# Patient Record
Sex: FEMALE | Race: WHITE | NOT HISPANIC OR LATINO | URBAN - METROPOLITAN AREA
[De-identification: names, ages, dates, MRNs, and addresses within clinical notes are randomized per-mention and may not be internally consistent; named-entity substitution may affect disease eponyms.]

---

## 2022-01-04 ENCOUNTER — NEW PATIENT (OUTPATIENT)
Dept: URBAN - METROPOLITAN AREA CLINIC 14 | Facility: CLINIC | Age: 62
End: 2022-01-04

## 2022-01-04 DIAGNOSIS — H25.13: ICD-10-CM

## 2022-01-04 PROCEDURE — 92004 COMPRE OPH EXAM NEW PT 1/>: CPT

## 2022-01-04 PROCEDURE — 92015 DETERMINE REFRACTIVE STATE: CPT

## 2022-01-04 ASSESSMENT — TONOMETRY
OD_IOP_MMHG: 12
OS_IOP_MMHG: 13

## 2022-01-04 ASSESSMENT — VISUAL ACUITY
OD_BCVA: 20/60
OS_BCVA: 20/40

## 2022-01-04 NOTE — PATIENT DISCUSSION
VISUALLY SIGNIFICANT: I have discussed the option of glasses versus cataract surgery versus following. It was explained that when the patients vision no longer meets their visual needs and a glasses prescription does not improve visual symptoms, the option of cataract surgery is a reasonable next step. It was explained that there is no guarantee that removing the cataract will improve their visual symptoms, however; it is believed that the cataract is contributing to the patient's visual impairment and surgery may improve both the visual and functional status of the patient. The risks, benefits and alternatives of surgery were discussed with the patient.  REFER to [facility] for [place] [facility]U.

## 2022-01-11 ENCOUNTER — ESTABLISHED PATIENT (OUTPATIENT)
Dept: URBAN - METROPOLITAN AREA CLINIC 9 | Facility: CLINIC | Age: 62
End: 2022-01-11

## 2022-01-11 VITALS — DIASTOLIC BLOOD PRESSURE: 94 MMHG | HEIGHT: 55 IN | SYSTOLIC BLOOD PRESSURE: 156 MMHG | HEART RATE: 91 BPM

## 2022-01-11 DIAGNOSIS — H25.043: ICD-10-CM

## 2022-01-11 DIAGNOSIS — H25.13: ICD-10-CM

## 2022-01-11 PROCEDURE — 92136 OPHTHALMIC BIOMETRY: CPT

## 2022-01-11 PROCEDURE — 99199ADVT ADVANCED VISION TESTING

## 2022-01-11 PROCEDURE — 99213 OFFICE O/P EST LOW 20 MIN: CPT

## 2022-01-11 ASSESSMENT — VISUAL ACUITY
OD_BCVA: 20/60
OS_BCVA: 20/40
OS_CC: 20/50
OD_CC: 20/60

## 2022-01-11 NOTE — PATIENT DISCUSSION
VISUALLY SIGNIFICANT SCHEDULE CATARACT SURGERY OD THEN OS IF SYMPTOMS PERSIST: I have discussed the option of glasses versus cataract surgery versus following. It was explained that when the patients vision no longer meets their visual needs and a glasses prescription does not improve visual symptoms, the option of cataract surgery is a reasonable next step. It was explained that there is no guarantee that removing the cataract will improve their visual symptoms, however; it is believed that the cataract is contributing to the patient's visual impairment and surgery may improve both the visual and functional status of the patient. The risks, benefits and alternatives of surgery were discussed with the patient. After this discussion, the patient desires to proceed with cataract surgery with implantation of an intraocular lens to improve vision.

## 2022-01-11 NOTE — PATIENT DISCUSSION
The patient desires to be less dependent on glasses at near, intermediate and distance. 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 Discussed compromises of trifocal and multi-focal iol's. She states she does not drive much at night. Pt to consider options.

## 2022-01-11 NOTE — PATIENT DISCUSSION
Advanced technology (presbyopia-correcting) implants can allow patients to see more things without glasses. The physician explained that advanced technology implants do not however guarantee good vision at all distances. The patient was advised that even with an advanced technology IOL, they may need glasses for some things. The advanced technology implant provides distance, intermediate, and some near vision. The near vision will require reading glasses for some things: particularly for fine print, prolonged reading or reading faded print like a newspaper. Starbursts or halos around lights are common side effects at night. Like other advanced technology implants, it can cause a loss of contrast which can affect activities that require good vision in dim lighting, such as driving at night or in poor visibility conditions, particularly with oncoming traffic. Advanced technology implants are optional and not covered by insurance, thus they are an additional expense.

## 2022-01-26 ENCOUNTER — POST-OP (OUTPATIENT)
Dept: URBAN - METROPOLITAN AREA CLINIC 14 | Facility: CLINIC | Age: 62
End: 2022-01-26

## 2022-01-26 DIAGNOSIS — Z96.1: ICD-10-CM

## 2022-01-26 DIAGNOSIS — Z98.890: ICD-10-CM

## 2022-01-26 PROCEDURE — 99024 POSTOP FOLLOW-UP VISIT: CPT

## 2022-01-26 ASSESSMENT — TONOMETRY: OD_IOP_MMHG: 19

## 2022-01-26 ASSESSMENT — VISUAL ACUITY
OD_SC: 20/25
OD_SC: J1

## 2022-02-01 ENCOUNTER — POST-OP (OUTPATIENT)
Dept: URBAN - METROPOLITAN AREA CLINIC 9 | Facility: CLINIC | Age: 62
End: 2022-02-01

## 2022-02-01 DIAGNOSIS — H25.12: ICD-10-CM

## 2022-02-01 DIAGNOSIS — Z96.1: ICD-10-CM

## 2022-02-01 PROCEDURE — 92136 OPHTHALMIC BIOMETRY: CPT

## 2022-02-01 PROCEDURE — 99024 POSTOP FOLLOW-UP VISIT: CPT

## 2022-02-01 ASSESSMENT — VISUAL ACUITY
OD_SC: 20/30
OS_CC: 20/50
OS_SC: 20/80
OS_BCVA: 20/40

## 2022-02-01 ASSESSMENT — TONOMETRY
OS_IOP_MMHG: 17
OD_IOP_MMHG: 17

## 2022-02-03 ENCOUNTER — POST-OP (OUTPATIENT)
Dept: URBAN - METROPOLITAN AREA CLINIC 14 | Facility: CLINIC | Age: 62
End: 2022-02-03

## 2022-02-03 DIAGNOSIS — H52.7: ICD-10-CM

## 2022-02-03 DIAGNOSIS — H25.043: ICD-10-CM

## 2022-02-03 DIAGNOSIS — Z96.1: ICD-10-CM

## 2022-02-03 PROCEDURE — 99024 POSTOP FOLLOW-UP VISIT: CPT

## 2022-02-03 ASSESSMENT — VISUAL ACUITY
OS_SC: 20/20
OD_SC: 20/25

## 2022-02-03 ASSESSMENT — TONOMETRY
OD_IOP_MMHG: 14
OS_IOP_MMHG: 16

## 2022-03-15 ENCOUNTER — POST-OP (OUTPATIENT)
Dept: URBAN - METROPOLITAN AREA CLINIC 14 | Facility: CLINIC | Age: 62
End: 2022-03-15

## 2022-03-15 DIAGNOSIS — Z98.890: ICD-10-CM

## 2022-03-15 DIAGNOSIS — Z96.1: ICD-10-CM

## 2022-03-15 PROCEDURE — 99024 POSTOP FOLLOW-UP VISIT: CPT

## 2022-03-15 ASSESSMENT — VISUAL ACUITY
OS_BCVA: 20/25
OD_BCVA: 20/20
OD_SC: 20/25
OS_SC: J1+
OU_SC: 20/20
OD_SC: J1+
OS_SC: 20/25

## 2022-03-15 ASSESSMENT — TONOMETRY
OS_IOP_MMHG: 14
OD_IOP_MMHG: 13

## 2022-07-02 RX ORDER — ROSUVASTATIN CALCIUM 40 MG/1
TABLET, COATED ORAL
COMMUNITY

## 2022-07-02 RX ORDER — FLUTICASONE PROPIONATE 50 MCG
SPRAY, SUSPENSION (ML) NASAL
COMMUNITY

## 2023-05-16 ENCOUNTER — ESTABLISHED PATIENT (OUTPATIENT)
Dept: URBAN - METROPOLITAN AREA CLINIC 18 | Facility: CLINIC | Age: 63
End: 2023-05-16

## 2023-05-16 DIAGNOSIS — H35.372: ICD-10-CM

## 2023-05-16 DIAGNOSIS — Z98.890: ICD-10-CM

## 2023-05-16 DIAGNOSIS — H43.821: ICD-10-CM

## 2023-05-16 DIAGNOSIS — H43.812: ICD-10-CM

## 2023-05-16 DIAGNOSIS — Z96.1: ICD-10-CM

## 2023-05-16 PROCEDURE — 92014 COMPRE OPH EXAM EST PT 1/>: CPT

## 2023-05-16 PROCEDURE — 92201 OPSCPY EXTND RTA DRAW UNI/BI: CPT

## 2023-05-16 PROCEDURE — 92134 CPTRZ OPH DX IMG PST SGM RTA: CPT

## 2023-05-16 ASSESSMENT — TONOMETRY
OS_IOP_MMHG: 9
OD_IOP_MMHG: 9

## 2023-05-16 ASSESSMENT — VISUAL ACUITY
OD_SC: 20/25-1
OS_SC: 20/30

## 2023-06-13 ENCOUNTER — ESTABLISHED PATIENT (OUTPATIENT)
Dept: URBAN - METROPOLITAN AREA CLINIC 18 | Facility: CLINIC | Age: 63
End: 2023-06-13

## 2023-06-13 DIAGNOSIS — H43.821: ICD-10-CM

## 2023-06-13 DIAGNOSIS — H35.372: ICD-10-CM

## 2023-06-13 DIAGNOSIS — H43.812: ICD-10-CM

## 2023-06-13 PROCEDURE — 99213 OFFICE O/P EST LOW 20 MIN: CPT

## 2023-06-13 PROCEDURE — 92134 CPTRZ OPH DX IMG PST SGM RTA: CPT

## 2023-06-13 PROCEDURE — 92201 OPSCPY EXTND RTA DRAW UNI/BI: CPT

## 2023-06-13 ASSESSMENT — TONOMETRY
OD_IOP_MMHG: 15
OS_IOP_MMHG: 13

## 2023-06-13 ASSESSMENT — VISUAL ACUITY
OS_SC: 20/25-2
OU_SC: 20/25-2
OD_SC: 20/40-2
OD_PH: 20/25-1